# Patient Record
Sex: MALE | Race: WHITE | NOT HISPANIC OR LATINO | Employment: OTHER | URBAN - METROPOLITAN AREA
[De-identification: names, ages, dates, MRNs, and addresses within clinical notes are randomized per-mention and may not be internally consistent; named-entity substitution may affect disease eponyms.]

---

## 2022-07-06 ENCOUNTER — APPOINTMENT (OUTPATIENT)
Dept: RADIOLOGY | Facility: MEDICAL CENTER | Age: 67
End: 2022-07-06
Attending: EMERGENCY MEDICINE
Payer: MEDICARE

## 2022-07-06 ENCOUNTER — HOSPITAL ENCOUNTER (OUTPATIENT)
Facility: MEDICAL CENTER | Age: 67
End: 2022-07-08
Attending: EMERGENCY MEDICINE | Admitting: INTERNAL MEDICINE
Payer: MEDICARE

## 2022-07-06 DIAGNOSIS — R26.81 GAIT INSTABILITY: ICD-10-CM

## 2022-07-06 DIAGNOSIS — T07.XXXA MULTIPLE OPEN WOUNDS: ICD-10-CM

## 2022-07-06 DIAGNOSIS — M79.671 RIGHT FOOT PAIN: ICD-10-CM

## 2022-07-06 PROBLEM — Z51.89 ENCOUNTER FOR WOUND CARE: Status: ACTIVE | Noted: 2022-07-06

## 2022-07-06 PROBLEM — T81.30XA WOUND DEHISCENCE: Status: ACTIVE | Noted: 2022-07-06

## 2022-07-06 LAB
ALBUMIN SERPL BCP-MCNC: 3.9 G/DL (ref 3.2–4.9)
ALBUMIN/GLOB SERPL: 1.7 G/DL
ALP SERPL-CCNC: 92 U/L (ref 30–99)
ALT SERPL-CCNC: 41 U/L (ref 2–50)
ANION GAP SERPL CALC-SCNC: 12 MMOL/L (ref 7–16)
AST SERPL-CCNC: 29 U/L (ref 12–45)
BASOPHILS # BLD AUTO: 0.8 % (ref 0–1.8)
BASOPHILS # BLD: 0.05 K/UL (ref 0–0.12)
BILIRUB SERPL-MCNC: 0.4 MG/DL (ref 0.1–1.5)
BUN SERPL-MCNC: 22 MG/DL (ref 8–22)
CALCIUM SERPL-MCNC: 8.9 MG/DL (ref 8.5–10.5)
CHLORIDE SERPL-SCNC: 106 MMOL/L (ref 96–112)
CK SERPL-CCNC: 309 U/L (ref 0–154)
CO2 SERPL-SCNC: 22 MMOL/L (ref 20–33)
CREAT SERPL-MCNC: 0.89 MG/DL (ref 0.5–1.4)
EOSINOPHIL # BLD AUTO: 0.12 K/UL (ref 0–0.51)
EOSINOPHIL NFR BLD: 2 % (ref 0–6.9)
ERYTHROCYTE [DISTWIDTH] IN BLOOD BY AUTOMATED COUNT: 44.9 FL (ref 35.9–50)
GFR SERPLBLD CREATININE-BSD FMLA CKD-EPI: 93 ML/MIN/1.73 M 2
GLOBULIN SER CALC-MCNC: 2.3 G/DL (ref 1.9–3.5)
GLUCOSE SERPL-MCNC: 76 MG/DL (ref 65–99)
HCT VFR BLD AUTO: 41.8 % (ref 42–52)
HGB BLD-MCNC: 14 G/DL (ref 14–18)
IMM GRANULOCYTES # BLD AUTO: 0.06 K/UL (ref 0–0.11)
IMM GRANULOCYTES NFR BLD AUTO: 1 % (ref 0–0.9)
LYMPHOCYTES # BLD AUTO: 1.2 K/UL (ref 1–4.8)
LYMPHOCYTES NFR BLD: 20 % (ref 22–41)
MCH RBC QN AUTO: 30.9 PG (ref 27–33)
MCHC RBC AUTO-ENTMCNC: 33.5 G/DL (ref 33.7–35.3)
MCV RBC AUTO: 92.3 FL (ref 81.4–97.8)
MONOCYTES # BLD AUTO: 0.64 K/UL (ref 0–0.85)
MONOCYTES NFR BLD AUTO: 10.7 % (ref 0–13.4)
NEUTROPHILS # BLD AUTO: 3.92 K/UL (ref 1.82–7.42)
NEUTROPHILS NFR BLD: 65.5 % (ref 44–72)
NRBC # BLD AUTO: 0 K/UL
NRBC BLD-RTO: 0 /100 WBC
PLATELET # BLD AUTO: 367 K/UL (ref 164–446)
PMV BLD AUTO: 9.6 FL (ref 9–12.9)
POTASSIUM SERPL-SCNC: 3.9 MMOL/L (ref 3.6–5.5)
PROT SERPL-MCNC: 6.2 G/DL (ref 6–8.2)
RBC # BLD AUTO: 4.53 M/UL (ref 4.7–6.1)
SODIUM SERPL-SCNC: 140 MMOL/L (ref 135–145)
WBC # BLD AUTO: 6 K/UL (ref 4.8–10.8)

## 2022-07-06 PROCEDURE — 36415 COLL VENOUS BLD VENIPUNCTURE: CPT

## 2022-07-06 PROCEDURE — 73610 X-RAY EXAM OF ANKLE: CPT | Mod: RT

## 2022-07-06 PROCEDURE — A9270 NON-COVERED ITEM OR SERVICE: HCPCS | Performed by: EMERGENCY MEDICINE

## 2022-07-06 PROCEDURE — G0378 HOSPITAL OBSERVATION PER HR: HCPCS

## 2022-07-06 PROCEDURE — 99285 EMERGENCY DEPT VISIT HI MDM: CPT

## 2022-07-06 PROCEDURE — 85025 COMPLETE CBC W/AUTO DIFF WBC: CPT

## 2022-07-06 PROCEDURE — 82550 ASSAY OF CK (CPK): CPT

## 2022-07-06 PROCEDURE — 80053 COMPREHEN METABOLIC PANEL: CPT

## 2022-07-06 PROCEDURE — 99219 PR INITIAL OBSERVATION CARE,LEVL II: CPT | Performed by: INTERNAL MEDICINE

## 2022-07-06 PROCEDURE — 700102 HCHG RX REV CODE 250 W/ 637 OVERRIDE(OP): Performed by: EMERGENCY MEDICINE

## 2022-07-06 PROCEDURE — 700102 HCHG RX REV CODE 250 W/ 637 OVERRIDE(OP): Performed by: STUDENT IN AN ORGANIZED HEALTH CARE EDUCATION/TRAINING PROGRAM

## 2022-07-06 PROCEDURE — 73630 X-RAY EXAM OF FOOT: CPT | Mod: RT

## 2022-07-06 PROCEDURE — A9270 NON-COVERED ITEM OR SERVICE: HCPCS | Performed by: STUDENT IN AN ORGANIZED HEALTH CARE EDUCATION/TRAINING PROGRAM

## 2022-07-06 RX ORDER — ACETAMINOPHEN 325 MG/1
650 TABLET ORAL EVERY 6 HOURS PRN
Status: DISCONTINUED | OUTPATIENT
Start: 2022-07-06 | End: 2022-07-08 | Stop reason: HOSPADM

## 2022-07-06 RX ORDER — ACETAMINOPHEN 500 MG
1000 TABLET ORAL EVERY 6 HOURS PRN
COMMUNITY

## 2022-07-06 RX ORDER — OXYCODONE HYDROCHLORIDE 5 MG/1
5 TABLET ORAL EVERY 4 HOURS PRN
Status: DISCONTINUED | OUTPATIENT
Start: 2022-07-06 | End: 2022-07-08 | Stop reason: HOSPADM

## 2022-07-06 RX ORDER — IBUPROFEN 200 MG
400 TABLET ORAL EVERY 6 HOURS PRN
COMMUNITY

## 2022-07-06 RX ORDER — ACETAMINOPHEN 500 MG
1000 TABLET ORAL ONCE
Status: COMPLETED | OUTPATIENT
Start: 2022-07-06 | End: 2022-07-06

## 2022-07-06 RX ORDER — ASPIRIN 325 MG
325 TABLET ORAL EVERY 12 HOURS
COMMUNITY

## 2022-07-06 RX ADMIN — ACETAMINOPHEN 1000 MG: 500 TABLET ORAL at 13:43

## 2022-07-06 RX ADMIN — RIVAROXABAN 10 MG: 10 TABLET, FILM COATED ORAL at 18:17

## 2022-07-06 ASSESSMENT — ENCOUNTER SYMPTOMS
FEVER: 0
WEAKNESS: 0
SEIZURES: 0
BLOOD IN STOOL: 0
CONSTIPATION: 0
SENSORY CHANGE: 0
DIARRHEA: 0
PALPITATIONS: 0
MUSCULOSKELETAL NEGATIVE: 1
RESPIRATORY NEGATIVE: 1
EYES NEGATIVE: 1
DIAPHORESIS: 0
HEADACHES: 0
WEIGHT LOSS: 0
GASTROINTESTINAL NEGATIVE: 1
ABDOMINAL PAIN: 0
PSYCHIATRIC NEGATIVE: 1
LOSS OF CONSCIOUSNESS: 0
FOCAL WEAKNESS: 0
SPEECH CHANGE: 0
CHILLS: 0
NAUSEA: 0
SHORTNESS OF BREATH: 0
DIZZINESS: 1
CARDIOVASCULAR NEGATIVE: 1
VOMITING: 0

## 2022-07-06 ASSESSMENT — PATIENT HEALTH QUESTIONNAIRE - PHQ9
SUM OF ALL RESPONSES TO PHQ9 QUESTIONS 1 AND 2: 0
1. LITTLE INTEREST OR PLEASURE IN DOING THINGS: NOT AT ALL
2. FEELING DOWN, DEPRESSED, IRRITABLE, OR HOPELESS: NOT AT ALL

## 2022-07-06 ASSESSMENT — LIFESTYLE VARIABLES
ALCOHOL_USE: YES
EVER FELT BAD OR GUILTY ABOUT YOUR DRINKING: NO
ON A TYPICAL DAY WHEN YOU DRINK ALCOHOL HOW MANY DRINKS DO YOU HAVE: 2
CONSUMPTION TOTAL: NEGATIVE
HAVE YOU EVER FELT YOU SHOULD CUT DOWN ON YOUR DRINKING: NO
TOTAL SCORE: 0
DOES PATIENT WANT TO STOP DRINKING: NO
HAVE PEOPLE ANNOYED YOU BY CRITICIZING YOUR DRINKING: NO
AVERAGE NUMBER OF DAYS PER WEEK YOU HAVE A DRINK CONTAINING ALCOHOL: 6
EVER HAD A DRINK FIRST THING IN THE MORNING TO STEADY YOUR NERVES TO GET RID OF A HANGOVER: NO
HOW MANY TIMES IN THE PAST YEAR HAVE YOU HAD 5 OR MORE DRINKS IN A DAY: 0

## 2022-07-06 ASSESSMENT — FIBROSIS 4 INDEX: FIB4 SCORE: 0.84

## 2022-07-06 ASSESSMENT — PAIN DESCRIPTION - PAIN TYPE: TYPE: ACUTE PAIN

## 2022-07-06 NOTE — ASSESSMENT & PLAN NOTE
Ulcers on right lower extremity show no signs of surrounding erythema, white discharge. Patient has no history of diabetes or poor wound healing. Does not have infection or complication of his wounds at present. However, home health is uncomfortable taking care of his wound. Will need placement in short term to provide wound care.   -Wound care consult  -Placement to Obs floor  -Awaiting placement to SNF

## 2022-07-06 NOTE — H&P
History & Physical Note    Date of Admission: 7/6/2022  Admission Status: Observation-Outpatient  [facility] Team: [facility] Team  Attending: Leonard Soares M.D.   Senior Resident: Dr. Renner  Intern: Dr. Vega  Contact Number: [phone]    Chief Complaint: Encounter for Wound Care      History of Present Illness (HPI):   Beny is a 66 y.o. male w/ PMH of paroxysmal afib currently in sinus rhythm, mitral regurgitation s/p annuloplasty, CAD, HLD, HTN who presented 7/6/2022 to the ED from home with wound care concerns. The patient was wounded attempting to move a piano on 6/28/22, using machinery to attempt to lift it himself, when the piano toppled over on its side and pinned his distal right lower extremity to the ground. The patient was pinned for ~15 hours and was able to use the cherry  close by to free himself. He subsequently presented to [facility] on 6/29/22 where the patient was informed he had a fracture. He was treated for rhabdomyolysis 2/2 crush injury and discharged 7/5/22 to home with home health arranged for wound care. The patient's home health nurse felt uncomfortable managing at home due to weeping and requiring multiple wound dressing changes per day. The nurse advised the patient present to the hospital for SNF placement. At present, the patient denies pain in his right foot. The patient denies fever, chills, or white discharge from his wound.    Review of Systems:   Review of Systems   Constitutional: Negative for chills, diaphoresis, fever, malaise/fatigue and weight loss.   HENT: Negative.  Negative for congestion, ear discharge, ear pain, hearing loss, nosebleeds and tinnitus.    Eyes: Negative.    Respiratory: Negative.  Negative for shortness of breath.    Cardiovascular: Negative.  Negative for chest pain and palpitations.   Gastrointestinal: Negative.  Negative for abdominal pain, blood in stool, constipation, diarrhea, melena, nausea and vomiting.   Genitourinary: Negative.   Negative for dysuria and hematuria.   Musculoskeletal: Negative.    Skin: Negative for itching and rash.   Neurological: Positive for dizziness. Negative for sensory change, speech change, focal weakness, seizures, loss of consciousness, weakness and headaches.   Endo/Heme/Allergies: Negative.    Psychiatric/Behavioral: Negative.        When reviewing histories, add date and indication in the history section whenever possible.  Past Medical History:   Past Medical History was reviewed with patient.   has a past medical history of Atrial fibrillation (HCC).    He has no past medical history of Cancer (HCC), History of myocardial infarction, or Syncope.    Past Surgical History: Past Surgical History was reviewed with patient.   has a past surgical history that includes zzz cardiac cath and mitral valve replace.    Medications: Medications have been reviewed with patient.  Prior to Admission Medications   Prescriptions Last Dose Informant Patient Reported? Taking?   acetaminophen (TYLENOL) 500 MG Tab 2022 at PM Patient No Yes   Sig: Take 1,000 mg by mouth every 6 hours as needed for Mild Pain. 2 tablets = 1,000 mg.   aspirin (ASA) 325 MG Tab 2022 at AM Patient No No   Sig: Take 325 mg by mouth every 12 hours. Indications: DVT Prevention   ibuprofen (MOTRIN) 200 MG Tab 2022 at PRN Patient Yes Yes   Sig: Take 400 mg by mouth every 6 hours as needed for Mild Pain. 2 tablets = 400 mg.      Facility-Administered Medications: None        Allergies: Allergies have been reviewed with patient.  No Known Allergies    Family History:   Family history as reported by patient includes unknown cancer in his father,  in his 80's    Social History:   Tobacco: smokes 12 oz loose leaf tobacco per month for 50 years, equivalent to 40 pack-years  Alcohol: 2-3 standard drinks per day  Recreational drugs (illegal and prescription):  denies  Employment: retired, formerly   Activity Level: ambulatory  Living  situation: lives alone on 1 acre property, family members close by  Recent travel: denies  Primary Care Provider: dwight Weathers M.D.  Other (stressors, spirituality, exposures):  denies  Physical Exam:   Vitals:  Temp:  [36.4 °C (97.5 °F)] 36.4 °C (97.5 °F)  Pulse:  [79-85] 84  Resp:  [16-18] 18  BP: (126-146)/(73-84) 146/83  SpO2:  [90 %-98 %] 93 %    Physical Exam  Constitutional:       General: He is not in acute distress.     Appearance: Normal appearance.   HENT:      Head: Normocephalic and atraumatic.      Nose: Nose normal.      Mouth/Throat:      Mouth: Mucous membranes are moist.      Pharynx: Oropharynx is clear.   Eyes:      Extraocular Movements: Extraocular movements intact.      Pupils: Pupils are equal, round, and reactive to light.   Cardiovascular:      Rate and Rhythm: Normal rate and regular rhythm.      Pulses: Normal pulses.      Heart sounds: Normal heart sounds.   Pulmonary:      Effort: Pulmonary effort is normal.      Breath sounds: Normal breath sounds.   Abdominal:      General: Abdomen is flat. Bowel sounds are normal.      Palpations: Abdomen is soft.   Musculoskeletal:         General: Normal range of motion.      Cervical back: Normal range of motion.   Skin:     General: Skin is warm and dry.      Capillary Refill: Capillary refill takes less than 2 seconds.      Findings: Signs of injury and wound present.          Neurological:      General: No focal deficit present.      Mental Status: He is alert and oriented to person, place, and time.         Labs:   Admission on 07/06/2022   Component Date Value   • WBC 07/06/2022 6.0    • RBC 07/06/2022 4.53 (A)   • Hemoglobin 07/06/2022 14.0    • Hematocrit 07/06/2022 41.8 (A)   • MCV 07/06/2022 92.3    • MCH 07/06/2022 30.9    • MCHC 07/06/2022 33.5 (A)   • RDW 07/06/2022 44.9    • Platelet Count 07/06/2022 367    • MPV 07/06/2022 9.6    • Neutrophils-Polys 07/06/2022 65.50    • Lymphocytes 07/06/2022 20.00 (A)   • Monocytes  07/06/2022 10.70    • Eosinophils 07/06/2022 2.00    • Basophils 07/06/2022 0.80    • Immature Granulocytes 07/06/2022 1.00 (A)   • Nucleated RBC 07/06/2022 0.00    • Neutrophils (Absolute) 07/06/2022 3.92    • Lymphs (Absolute) 07/06/2022 1.20    • Monos (Absolute) 07/06/2022 0.64    • Eos (Absolute) 07/06/2022 0.12    • Baso (Absolute) 07/06/2022 0.05    • Immature Granulocytes (a* 07/06/2022 0.06    • NRBC (Absolute) 07/06/2022 0.00    • Sodium 07/06/2022 140    • Potassium 07/06/2022 3.9    • Chloride 07/06/2022 106    • Co2 07/06/2022 22    • Anion Gap 07/06/2022 12.0    • Glucose 07/06/2022 76    • Bun 07/06/2022 22    • Creatinine 07/06/2022 0.89    • Calcium 07/06/2022 8.9    • AST(SGOT) 07/06/2022 29    • ALT(SGPT) 07/06/2022 41    • Alkaline Phosphatase 07/06/2022 92    • Total Bilirubin 07/06/2022 0.4    • Albumin 07/06/2022 3.9    • Total Protein 07/06/2022 6.2    • Globulin 07/06/2022 2.3    • A-G Ratio 07/06/2022 1.7    • CPK Total 07/06/2022 309 (A)   • GFR (CKD-EPI) 07/06/2022 93      Imaging:   DX-ANKLE 3+ VIEWS RIGHT   Final Result      1.  Acute mildly displaced small fracture of the dorsal talar neck.   2.  Periarticular soft tissue swelling.         DX-FOOT-COMPLETE 3+ RIGHT   Final Result      1.  Nondisplaced fracture of the fifth proximal phalangeal shaft.   2.  Mildly displaced small fracture of the dorsal talar neck.        Previous Data Review: reviewed    Problem Representation:     * Encounter for wound care  Assessment & Plan  Ulcers on right lower extremity show no signs of surrounding erythema, white discharge. Patient has no history of diabetes or poor wound healing. Does not have infection or complication of his wounds at present. However, home health is uncomfortable taking care of his wound. Will need placement in short term to provide wound care.   -Wound care consult  -Placement to Obs floor  -Awaiting placement to SNF    HLD (hyperlipidemia)- (present on admission)  Assessment  & Plan  Lost to follow up after previous physician moved. Not currently on any medication. Has not seen PCP in years. Will need outpatient follow up    HTN (hypertension)- (present on admission)  Assessment & Plan  Lost to follow up after previous physician moved. Not currently on any medication. Has not seen PCP in years. Will need outpatient follow up    Atrial fibrillation (HCC)- (present on admission)  Assessment & Plan  Holter Monitoring from 2014 showed 0% Afib, not currently symptomatic, in sinus rhythm. No telemetry or AC required at this time.     DVT prophylaxis: xorelto 10 mg  Diet: cardiac    Full code

## 2022-07-06 NOTE — DISCHARGE PLANNING
Pt was admitted at [facility] from 6/29/22 - 07/04/22

## 2022-07-06 NOTE — ED NOTES
Wound Care order placed and Wound RN called for consult. Pictures of RIGHT foot wound taken and uploaded to Epic.

## 2022-07-06 NOTE — ASSESSMENT & PLAN NOTE
Lost to follow up after previous physician moved. Not currently on any medication. Has not seen PCP in years. Will need outpatient follow up

## 2022-07-06 NOTE — ED TRIAGE NOTES
Chief Complaint   Patient presents with   • Foot Injury     right     Pt wheeled to triage he was d/c from [facility] last Monday. Last Tuesday pt was pinned under piano for 15 hours. He was diagnosed with rhabdomyolysis and crushed injury to his right foot and d/c with home health nurse. Home health nurse came last night and was advised to come here to be trf to Skilled facility for daily right foot dressings. Pt has multiple wounds from blisters that formed and opened.

## 2022-07-06 NOTE — ASSESSMENT & PLAN NOTE
Lost to follow up after previous physician moved. Not currently on any medication. Has not seen PCP in years. Will need outpatient follow up   Spontaneous, unlabored and symmetrical

## 2022-07-06 NOTE — ED PROVIDER NOTES
ED Provider Note    Scribed for Kevin Torrez M.D. by Juwan Chacon. 7/6/2022, 9:51 AM.    Primary care provider: Aurelio Weathers M.D.  Means of arrival: Walk-in  History obtained from: Patient  History limited by: None    CHIEF COMPLAINT  Chief Complaint   Patient presents with   • Foot Injury     right       HPI  Beny Tolentino is a 66 y.o. male who presents to the Emergency Department for evaluation of a right foot injury. Onset on eight days ago. He states that his foot was pinned under a piano for fifteen hours. Following this he [facility] to [facility]. He was admitted to the hospital and informed that he had a fracture. He was also diagnosed with rhabdomyolysis at this time. He was discharged two days ago with a home health nurse. However, the home health care he was assigned is not able to come to daily to change bandages. The patient and his family state that he is weeping  through his bandages and needs daily re-dressing. They are unable to change the dressing themselves. He was referred to present to the ED to be transferred to a skilled facility to receive daily dressings.  They expressed significant concern that he has a lot of weeping from the open blisters and is unable to care for himself, ambulate or walk, and unable to wrap or change dressing.  The pain was unable to care for him.  He has a history of multiple kidney stones and open heart surgery for a mitral valvae prolapse. There are no known alleviating or exacerbating factors.       REVIEW OF SYSTEMS  Review of Systems   Musculoskeletal:        Positive for injury to right foot with weeping.    All other systems reviewed and are negative.      PAST MEDICAL HISTORY   has a past medical history of Atrial fibrillation (HCC).    SURGICAL HISTORY   has a past surgical history that includes zzz cardiac cath and mitral valve replace.    SOCIAL HISTORY  Social History     Tobacco Use   • Smoking status: Current Every Day Smoker      Years: 40.00     Types: Cigars   • Smokeless tobacco: Never Used   Substance Use Topics   • Alcohol use: Yes   • Drug use: No      Social History     Substance and Sexual Activity   Drug Use No       FAMILY HISTORY  Family History   Problem Relation Age of Onset   • Other Mother    • Other Father        CURRENT MEDICATIONS  Home Medications     Reviewed by Ro Kay R.N. (Registered Nurse) on 07/06/22 at 0733  Med List Status: Complete   Medication Last Dose Status        Patient Fabian Taking any Medications                       ALLERGIES  No Known Allergies    PHYSICAL EXAM  VITAL SIGNS: /80   Pulse 79   Temp 36.4 °C (97.5 °F) (Temporal)   Resp 16   Ht 1.829 m (6')   Wt 72.6 kg (160 lb)   SpO2 97%   BMI 21.70 kg/m²   Vitals reviewed.  Constitutional: Well developed, Well nourished, No acute distress, Non-toxic appearance.   HENT: Normocephalic, Atraumatic, Bilateral external ears normal,   Eyes: PERRL, EOMI, Conjunctiva normal, No discharge.   Neck: Normal range of motion, No tenderness, Supple, No stridor.   Cardiovascular: Normal heart rate, Normal rhythm, No murmurs, No rubs, No gallops.   Thorax & Lungs: Normal breath sounds, No respiratory distress, No wheezing  Abdomen: Bowel sounds normal, Soft, No tenderness  Skin: Warm, Dry, No erythema, No rash.   Back: No tenderness, No CVA tenderness.    Musculoskeletal: Good range of motion in all major joints.  Right foot has multiple blisters many of which are unroofed.  The ones that are still of some fluids are leaking and weeping.  There is mild redness but does not appear infected there is no purulence.  His foot is perfused.  There is intact movement and sensation.  Neurologic: Alert, No focal deficits noted.   Psychiatric: Affect normal    LABS   Results for orders placed or performed during the hospital encounter of 07/06/22   CBC WITH DIFFERENTIAL   Result Value Ref Range    WBC 6.0 4.8 - 10.8 K/uL    RBC 4.53 (L) 4.70 - 6.10 M/uL     Hemoglobin 14.0 14.0 - 18.0 g/dL    Hematocrit 41.8 (L) 42.0 - 52.0 %    MCV 92.3 81.4 - 97.8 fL    MCH 30.9 27.0 - 33.0 pg    MCHC 33.5 (L) 33.7 - 35.3 g/dL    RDW 44.9 35.9 - 50.0 fL    Platelet Count 367 164 - 446 K/uL    MPV 9.6 9.0 - 12.9 fL    Neutrophils-Polys 65.50 44.00 - 72.00 %    Lymphocytes 20.00 (L) 22.00 - 41.00 %    Monocytes 10.70 0.00 - 13.40 %    Eosinophils 2.00 0.00 - 6.90 %    Basophils 0.80 0.00 - 1.80 %    Immature Granulocytes 1.00 (H) 0.00 - 0.90 %    Nucleated RBC 0.00 /100 WBC    Neutrophils (Absolute) 3.92 1.82 - 7.42 K/uL    Lymphs (Absolute) 1.20 1.00 - 4.80 K/uL    Monos (Absolute) 0.64 0.00 - 0.85 K/uL    Eos (Absolute) 0.12 0.00 - 0.51 K/uL    Baso (Absolute) 0.05 0.00 - 0.12 K/uL    Immature Granulocytes (abs) 0.06 0.00 - 0.11 K/uL    NRBC (Absolute) 0.00 K/uL   COMP METABOLIC PANEL   Result Value Ref Range    Sodium 140 135 - 145 mmol/L    Potassium 3.9 3.6 - 5.5 mmol/L    Chloride 106 96 - 112 mmol/L    Co2 22 20 - 33 mmol/L    Anion Gap 12.0 7.0 - 16.0    Glucose 76 65 - 99 mg/dL    Bun 22 8 - 22 mg/dL    Creatinine 0.89 0.50 - 1.40 mg/dL    Calcium 8.9 8.5 - 10.5 mg/dL    AST(SGOT) 29 12 - 45 U/L    ALT(SGPT) 41 2 - 50 U/L    Alkaline Phosphatase 92 30 - 99 U/L    Total Bilirubin 0.4 0.1 - 1.5 mg/dL    Albumin 3.9 3.2 - 4.9 g/dL    Total Protein 6.2 6.0 - 8.2 g/dL    Globulin 2.3 1.9 - 3.5 g/dL    A-G Ratio 1.7 g/dL   CREATINE KINASE   Result Value Ref Range    CPK Total 309 (H) 0 - 154 U/L   ESTIMATED GFR   Result Value Ref Range    GFR (CKD-EPI) 93 >60 mL/min/1.73 m 2       All labs reviewed by me.    RADIOLOGY  DX-ANKLE 3+ VIEWS RIGHT   Final Result      1.  Acute mildly displaced small fracture of the dorsal talar neck.   2.  Periarticular soft tissue swelling.         DX-FOOT-COMPLETE 3+ RIGHT   Final Result      1.  Nondisplaced fracture of the fifth proximal phalangeal shaft.   2.  Mildly displaced small fracture of the dorsal talar neck.        The radiologist's  interpretation of all radiological studies have been reviewed by me.    COURSE & MEDICAL DECISION MAKING  Pertinent Labs & Imaging studies reviewed. (See chart for details)    Obtained and reviewed past medical records.    9:51 AM Patient seen and examined at bedside. The patient presents with an injury to the right foot, and the differential diagnosis includes but is not limited to open wounds, fractures, early infection.  Ordered for DX-Ankle 3+ views right, Dx-Foot complete 3+ right, Creatine kinase, CBC with differential, and CMP to evaluate.     12:59 PM Patient was reevaluated at bedside. Discussed lab and radiology results with the patient.      I debrided the blisters on the right foot.  Most of these are already open and some skin remains.  The blisters that are still containing fluid are all weeping and oozing and are obviously open and weeping.  These are debrided by me.    Debridement procedure note    Area is debrided with scissors without difficulty.  Dry dressing is placed.    Ultimately was converted to a Xeroform dressing with a walking boot.      I have consulted wound care with him and unable to come down.  It is unclear to me the wounds require hospitalization they do not appear to require surgical debridement and they do not appear infected.  The family is significantly concerned about the patient's inability to stand, walk or care for himself.  They state they cannot care for him.  Family is requesting hospitalization possible SNF placement.    12:58 PM Paged Hospitalist.     1:16 PM I discussed the patient's case and the above findings with Dr. Soares (Hospitalist) who agrees to evaluate the patient for hospitalization.    1:26 PM - I reevaluated the patient at bedside. Patient will be treated with tylenol tablet 1,000 mg. I informed the patient of my plan to admit today given the patient's current presentation and diagnostic study results. Patient verbalizes understanding and support with my  plan for admission.           DISPOSITION:  Patient will be hospitalized by Dr. Soares in guarded condition.      FINAL IMPRESSION  1. Multiple open wounds    2. Right foot pain    3. Gait instability    4.  Inability to care for self     Juwan MCCLURE (Scribe), am scribing for, and in the presence of, Kevin Torrez M.D..    Electronically signed by: Juwan Chacon (Scribe), 7/6/2022    Kevin MCCLURE M.D. personally performed the services described in this documentation, as scribed by Juwan Chacon in my presence, and it is both accurate and complete.    The note accurately reflects work and decisions made by me.  Kevin Torrez M.D.  7/6/2022  3:29 PM

## 2022-07-06 NOTE — ED NOTES
Attempted to call report to receiving RN. RN busy at this time and stated he would call right back.

## 2022-07-06 NOTE — DISCHARGE PLANNING
Anticipated Discharge Disposition: SNF vs Home with more dressing change support    Action: CM spoke with CM at [facility] and pt was discharged with [facility]. Visiting nurse sent pt to [facility] today (as we 'have the best wound care') to be placed in a SNF as she feels pt needs daily dressing changes. RN comes 3x/week, pt states he can manage some changes but family lives an hour away. SNF choice was obtained and scanned to media tab for future dc planning use.    CM at [facility] confirmed pt had an INPT stay from 6/29-7/4.      Barriers to Discharge: Wound consult for recommendations on dressing changes; PT consult    Plan: admit OBS

## 2022-07-06 NOTE — ED NOTES
Pt transported off unit with all belongings with transport tech. Pt awake and breathing with even, unlabored respirations at time of transfer.

## 2022-07-06 NOTE — ED NOTES
Walking boot delivered to bedside. Per , pt requested walking boot from [facility] but wound team felt that pt's blisters were too severe/open for walking boot. Will wait for wound team's assessment today to place boot on patient.

## 2022-07-06 NOTE — ASSESSMENT & PLAN NOTE
Holter Monitoring from 2014 showed 0% Afib, not currently symptomatic, in sinus rhythm. No telemetry or AC required at this time.

## 2022-07-06 NOTE — ED NOTES
Report received from [facility]. Pt is A&Ox4 and awake in bed. Pt on pulse ox and automatic BP. VSS, saturating above 95% on RA, HR in the 80s.

## 2022-07-06 NOTE — ED NOTES
Triage rounding: I apologized for the wait, explained that we have to area of ER for fast track and main. Waiting for room in main area for possible admission.   Nad.

## 2022-07-07 ENCOUNTER — APPOINTMENT (OUTPATIENT)
Dept: RADIOLOGY | Facility: MEDICAL CENTER | Age: 67
End: 2022-07-07
Attending: INTERNAL MEDICINE
Payer: MEDICARE

## 2022-07-07 PROBLEM — S92.111A: Status: ACTIVE | Noted: 2022-07-07

## 2022-07-07 PROBLEM — S91.301A OPEN WOUND OF RIGHT FOOT: Status: ACTIVE | Noted: 2022-07-07

## 2022-07-07 PROCEDURE — 700102 HCHG RX REV CODE 250 W/ 637 OVERRIDE(OP): Performed by: STUDENT IN AN ORGANIZED HEALTH CARE EDUCATION/TRAINING PROGRAM

## 2022-07-07 PROCEDURE — 99225 PR SUBSEQUENT OBSERVATION CARE,LEVEL II: CPT | Performed by: INTERNAL MEDICINE

## 2022-07-07 PROCEDURE — 73700 CT LOWER EXTREMITY W/O DYE: CPT | Mod: RT,MG

## 2022-07-07 PROCEDURE — A9270 NON-COVERED ITEM OR SERVICE: HCPCS | Performed by: STUDENT IN AN ORGANIZED HEALTH CARE EDUCATION/TRAINING PROGRAM

## 2022-07-07 PROCEDURE — 97162 PT EVAL MOD COMPLEX 30 MIN: CPT

## 2022-07-07 PROCEDURE — G0378 HOSPITAL OBSERVATION PER HR: HCPCS

## 2022-07-07 RX ORDER — ENOXAPARIN SODIUM 100 MG/ML
40 INJECTION SUBCUTANEOUS DAILY
Status: DISCONTINUED | OUTPATIENT
Start: 2022-07-08 | End: 2022-07-08 | Stop reason: HOSPADM

## 2022-07-07 RX ADMIN — ACETAMINOPHEN 650 MG: 325 TABLET, FILM COATED ORAL at 16:12

## 2022-07-07 RX ADMIN — ACETAMINOPHEN 650 MG: 325 TABLET, FILM COATED ORAL at 09:03

## 2022-07-07 RX ADMIN — ACETAMINOPHEN 650 MG: 325 TABLET, FILM COATED ORAL at 22:32

## 2022-07-07 ASSESSMENT — COGNITIVE AND FUNCTIONAL STATUS - GENERAL
STANDING UP FROM CHAIR USING ARMS: A LITTLE
MOVING FROM LYING ON BACK TO SITTING ON SIDE OF FLAT BED: A LITTLE
MOVING TO AND FROM BED TO CHAIR: A LITTLE
MOBILITY SCORE: 19
SUGGESTED CMS G CODE MODIFIER MOBILITY: CK
CLIMB 3 TO 5 STEPS WITH RAILING: A LITTLE
WALKING IN HOSPITAL ROOM: A LITTLE

## 2022-07-07 ASSESSMENT — ENCOUNTER SYMPTOMS
SEIZURES: 0
MYALGIAS: 0
ABDOMINAL PAIN: 0
DIAPHORESIS: 0
PALPITATIONS: 0
NECK PAIN: 0
FEVER: 0
BLOOD IN STOOL: 0
SPUTUM PRODUCTION: 0
FOCAL WEAKNESS: 0
NAUSEA: 0
VOMITING: 0
DIZZINESS: 0
SORE THROAT: 0
WHEEZING: 0
FLANK PAIN: 0
BLURRED VISION: 0
COUGH: 0
BRUISES/BLEEDS EASILY: 0
DIARRHEA: 0
BACK PAIN: 0
HEADACHES: 0
CHILLS: 0
SHORTNESS OF BREATH: 0

## 2022-07-07 ASSESSMENT — PAIN DESCRIPTION - PAIN TYPE
TYPE: ACUTE PAIN

## 2022-07-07 ASSESSMENT — GAIT ASSESSMENTS
DISTANCE (FEET): 50
ASSISTIVE DEVICE: FRONT WHEEL WALKER
GAIT LEVEL OF ASSIST: SUPERVISED

## 2022-07-07 NOTE — PROGRESS NOTES
Hospital Medicine Daily Progress Note    Date of Service  7/7/2022    Chief Complaint  Beny Tolentino is a 66 y.o. male admitted 7/6/2022 with r foot wound    Hospital Course  66-year-old male who presented with a right foot pain and wound after his foot was pinned under a piano for 15 hours.  He was initially evaluated at [facility] and was diagnosed with rhabdomyolysis for which she was treated with IV fluids.  X-ray of his right ankle reveals acute mildly displaced fracture of talar neck    Interval Problem Update  Patient states he is having difficulty managing his wound at home and was instructed by his home health nurse to present to the ER.  He states he is unable to bear weight on his right foot.  I have consulted orthopedic surgery Dr. Espino who will evaluate the patient and has requested a CT of his right ankle.  Wound care has been consulted    I have discussed this patient's plan of care and discharge plan at IDT rounds today with Case Management, Nursing, Nursing leadership, and other members of the IDT team.    Consultants/Specialty  orthopedics    Code Status  Full Code    Disposition  Patient is not medically cleared for discharge.   Anticipate discharge to to skilled nursing facility.  I have placed the appropriate orders for post-discharge needs.    Review of Systems  Review of Systems   Constitutional: Negative for chills, diaphoresis and fever.   HENT: Negative for hearing loss and sore throat.    Eyes: Negative for blurred vision.   Respiratory: Negative for cough, sputum production, shortness of breath and wheezing.    Cardiovascular: Negative for chest pain, palpitations and leg swelling.   Gastrointestinal: Negative for abdominal pain, blood in stool, diarrhea, nausea and vomiting.   Genitourinary: Negative for dysuria, flank pain and urgency.   Musculoskeletal: Positive for joint pain (R foot and ankle). Negative for back pain, myalgias and neck pain.   Skin: Negative for rash.    Neurological: Negative for dizziness, focal weakness, seizures and headaches.   Endo/Heme/Allergies: Does not bruise/bleed easily.   Psychiatric/Behavioral: Negative for suicidal ideas.        Physical Exam  Temp:  [36.5 °C (97.7 °F)-36.8 °C (98.2 °F)] 36.8 °C (98.2 °F)  Pulse:  [63-93] 71  Resp:  [18-20] 18  BP: (114-154)/(70-88) 119/74  SpO2:  [90 %-98 %] 94 %    Physical Exam  Vitals and nursing note reviewed.   Constitutional:       General: He is not in acute distress.     Appearance: Normal appearance.   HENT:      Head: Normocephalic and atraumatic.      Nose: Nose normal.      Mouth/Throat:      Mouth: Mucous membranes are moist.   Eyes:      Extraocular Movements: Extraocular movements intact.      Conjunctiva/sclera: Conjunctivae normal.      Pupils: Pupils are equal, round, and reactive to light.   Cardiovascular:      Rate and Rhythm: Normal rate and regular rhythm.      Pulses: Normal pulses.      Heart sounds: Normal heart sounds.   Pulmonary:      Effort: Pulmonary effort is normal. No respiratory distress.      Breath sounds: Normal breath sounds. No wheezing, rhonchi or rales.   Abdominal:      General: Bowel sounds are normal. There is no distension.      Palpations: Abdomen is soft.      Tenderness: There is no abdominal tenderness.   Musculoskeletal:         General: No swelling or tenderness. Normal range of motion.      Cervical back: Normal range of motion and neck supple.   Lymphadenopathy:      Cervical: No cervical adenopathy.   Skin:     General: Skin is warm.      Coloration: Skin is not jaundiced.      Findings: No rash.      Comments: R foot wrapped in dressing   Neurological:      General: No focal deficit present.      Mental Status: He is alert and oriented to person, place, and time.      Cranial Nerves: No cranial nerve deficit.      Motor: No weakness.   Psychiatric:         Mood and Affect: Mood normal.         Behavior: Behavior normal.         Fluids    Intake/Output Summary  (Last 24 hours) at 7/7/2022 0907  Last data filed at 7/7/2022 0904  Gross per 24 hour   Intake 360 ml   Output 1575 ml   Net -1215 ml       Laboratory  Recent Labs     07/06/22  1142   WBC 6.0   RBC 4.53*   HEMOGLOBIN 14.0   HEMATOCRIT 41.8*   MCV 92.3   MCH 30.9   MCHC 33.5*   RDW 44.9   PLATELETCT 367   MPV 9.6     Recent Labs     07/06/22  1142   SODIUM 140   POTASSIUM 3.9   CHLORIDE 106   CO2 22   GLUCOSE 76   BUN 22   CREATININE 0.89   CALCIUM 8.9                   Imaging  DX-ANKLE 3+ VIEWS RIGHT   Final Result      1.  Acute mildly displaced small fracture of the dorsal talar neck.   2.  Periarticular soft tissue swelling.         DX-FOOT-COMPLETE 3+ RIGHT   Final Result      1.  Nondisplaced fracture of the fifth proximal phalangeal shaft.   2.  Mildly displaced small fracture of the dorsal talar neck.      CT-ANKLE W/O PLUS RECONS RIGHT    (Results Pending)   CT-FOOT W/O PLUS RECONS RIGHT    (Results Pending)        Assessment/Plan  Displaced fracture of neck of right talus  Assessment & Plan  Ortho has been consulted  Pain control with Tylenol and oxycodone  PT OT    Open wound of right foot  Assessment & Plan  crush injury from being pinned under a piano for 15 hours  Wound care has been consulted         VTE prophylaxis: enoxaparin ppx    I have performed a physical exam and reviewed and updated ROS and Plan today (7/7/2022). In review of yesterday's note (7/6/2022), there are no changes except as documented above.

## 2022-07-07 NOTE — PROGRESS NOTES
SENIOR ADMIT NOTE:    Carmella Renner M.D.  Date & Time note created:    7/6/2022   7:16 PM       Chief Complaint:  Weeping Wound    History of Present Illness:      66 y.o M w/pmhx of MR s/p annuloplasty, CAD, HLD, HTN, ?afib, who presented to the ED w/ c/o of weeping wound. Patient was admitted to [facility] for rhabdomylosis 2/2 crush injury (pinned underneath a piano for 16 hours) and fx of the right talus. He received aggressive fluid therapy for rhabdomyolysis which improved. He was evaluated by podiatry, no orthopedic consult done,  recommended walking boot and outpatient follow up. He was discharge on 7/4 with  for home care. Per family, HH RN evaluated wound and thought that he would need daily dressing, family is unable to assist so HH RN recommended that he be evaluated in the ED for facilitation to skilled nursing facility.   Patient reports that his pain is significantly improved, now 3/10. He is able to bear weight on his right foot, however, cannot ambulate. He denies any fever, chills, SOB since discharge.   Wound debridement done by EDP and dry dressing applied    Physical Exam:   Weight/BMI: Body mass index is 22.39 kg/m².  BP (!) 146/75   Pulse 66   Temp 36.7 °C (98 °F) (Temporal)   Resp 20   Ht 1.829 m (6')   Wt 74.9 kg (165 lb 2 oz)   SpO2 96%   Vitals:    07/06/22 1500 07/06/22 1600 07/06/22 1654 07/06/22 1917   BP: 132/88 (!) 154/79 (!) 146/75 126/70   Pulse: 93 79 66 75   Resp:   20 20   Temp:   36.7 °C (98 °F) 36.8 °C (98.2 °F)   TempSrc:   Temporal Temporal   SpO2: 98% 95% 96% 97%   Weight:   74.9 kg (165 lb 2 oz)    Height:   1.829 m (6')      Oxygen Therapy:  Pulse Oximetry: 96 %, O2 (LPM): 0, O2 Delivery Device: None - Room Air    Physical exam:  Constitutional:  No acute distress. A&O x3  HENMT:  Normocephalic, Atraumatic  Eyes:  PERRLA, EOMI, Conjunctiva normal  Neck:  Supple, Full range of motion, No stridor  Cardiovascular:  Regular rate and rhythm, No murmurs, No rubs, No  gallops.   Lungs: Respiratory effort is normal, no crackles, no wheezing.  Extremities: Good pedal pulses b/l, no edema, 5/5 strength.  Abdomen: Bowel sounds x4, Soft, Non-tender, Non-distended, No guarding, No rebound, No masses.  Neurologic: Good sensations, Cranial nerves II through XII grossly intact. No focal deficits noted.    Imaging  DX-ANKLE 3+ VIEWS RIGHT   Final Result      1.  Acute mildly displaced small fracture of the dorsal talar neck.   2.  Periarticular soft tissue swelling.         DX-FOOT-COMPLETE 3+ RIGHT   Final Result      1.  Nondisplaced fracture of the fifth proximal phalangeal shaft.   2.  Mildly displaced small fracture of the dorsal talar neck.          ASSESSMENT/PLAN:  #Open foot wound  #Displaced fx of dorsal talar neck   #Rhabdomyolysis from Crush injury   #?Afib   #MR s/p annuloplasty   - wound debridement done by EDP, no signs or symptoms of superimposed infection. Neurovascularly intact   - Wound consult placed.   - Ortho consult in AM for displaced fx (appears stable from previous), will keep non weight bearing on injured foot until evaluation   - PT/OT to assist with placement. CM to assist with placement   - Rhado resolved, encourage oral intake   - reported hx of sharmin-operative afib in 2014, however had holter monitoring done in 2015 w/ 0% Afib burden. Will need to follow up with cardiology to evaluate if longer term cardiac monitoring needed   -Stable CAD, follow up with PCP and cardiology for secondary prevention.     For full details please refer to H&P done by Dr. Silvia Renner M.D.

## 2022-07-07 NOTE — CARE PLAN
The patient is Stable - Low risk of patient condition declining or worsening    Shift Goals  Clinical Goals: Wound care, PT/OT, SNF placement  Patient Goals: rest    Progress made toward(s) clinical / shift goals:        Problem: Pain - Standard  Goal: Alleviation of pain or a reduction in pain to the patient’s comfort goal  Outcome: Progressing     Problem: Knowledge Deficit - Standard  Goal: Patient and family/care givers will demonstrate understanding of plan of care, disease process/condition, diagnostic tests and medications  Outcome: Progressing     Problem: Fall Risk  Goal: Patient will remain free from falls  Outcome: Progressing       Patient is not progressing towards the following goals:

## 2022-07-07 NOTE — PROGRESS NOTES
4 Eyes Skin Assessment Completed by Tee RN and NIKI Pimentel.    Head WDL  Ears WDL  Nose WDL  Mouth WDL  Neck WDL  Breast/Chest WDL  Shoulder Blades WDL  Spine WDL  (R) Arm/Elbow/Hand WDL  (L) Arm/Elbow/Hand WDL  Abdomen WDL  Groin WDL  Scrotum/Coccyx/Buttocks WDL  (R) Leg Bruising  (L) Leg WDL  (R) Heel/Foot/Toe Bruising  (L) Heel/Foot/Toe WDL          Devices In Places Pulse Ox      Interventions In Place N/A    Possible Skin Injury Yes    Pictures Uploaded Into Epic Yes  Wound Consult Placed Yes  RN Wound Prevention Protocol Ordered No

## 2022-07-07 NOTE — THERAPY
"Physical Therapy   Initial Evaluation     Patient Name: Beny Tolentino  Age:  66 y.o., Sex:  male  Medical Record #: [id number]  Today's Date: 7/7/2022     Precautions  Precautions: Fall Risk;Non Weight Bearing Right Lower Extremity  Comments: NWB R LE per patient- he thinks he recalls being told at [place] he could be WBAT in boot  but unsure    Assessment  Mr. Tolentino is a 65 y/o male who presents to [facility] for wound care to R foot. He had recently been discharged home from [facility] secondary to a crush injury to R foot (foot was stuck under piano for 15 hours). No further information was provided. Pt reported \"they told me I broke two bones but they were aligned so they weren't going to do anything\". He also reported being NWB R LE, however recalls his MD stating that once his wounds allow he can wear a boot and then be WBAT. Relayed this to RN who plans to call [facility] to get definitive information. Since initial chart review and meeting with patient imaging has revealed talus fracture. Pt was mobilizing at home NWB with FWW, only had issue with wounds.    Pt able to demonstrate gait, transfers, and bed mobility without physical assist. Steady with FWW and maintained NWB throughout. Discussed plan to find out WB with boot, as if this is correct pt's mobility will only improve. Pt in agreement with this. Otherwise no additional acute PT needs as he is mobilizing without assist. Will defer placement needs to wound care as this appears to be his primary deficit.  Patient will not be actively followed for physical therapy services at this time, however may be seen if requested by physician for 1 more visit within 30 days to address any discharge or equipment needs.       Plan    Patient will not be actively followed for physical therapy services at this time, however may be seen if requested by physician for 1 more visit within 30 days to address any discharge or equipment needs.     DC Equipment Recommendations: " None  Discharge Recommendations: Anticipate that the patient will have no further physical therapy needs after discharge from the hospital            Objective       07/07/22 1045   Prior Living Situation   Prior Services None   Housing / Facility 1 Story House   Steps Into Home 1   Equipment Owned Front-Wheel Walker   Lives with - Patient's Self Care Capacity Alone and Able to Care For Self   Comments has daughters in [place] as well   Prior Level of Functional Mobility   Bed Mobility Independent   Transfer Status Independent   Ambulation Independent   Distance Ambulation (Feet)   (community ambulator)   Assistive Devices Used None   Stairs Independent   Comments typically no AD, however recently discharged from [place] NWB with FWW to home   Cognition    Level of Consciousness Alert   Comments very pleasant   Passive ROM Lower Body   Passive ROM Lower Body WDL   Active ROM Lower Body    Active ROM Lower Body  WDL   Strength Lower Body   Lower Body Strength  WDL   Sensation Lower Body   Lower Extremity Sensation   WDL   Balance Assessment   Sitting Balance (Static) Good   Sitting Balance (Dynamic) Good   Standing Balance (Static) Good   Standing Balance (Dynamic) Fair   Weight Shift Sitting Good   Weight Shift Standing Absent   Comments NWB with FWW   Gait Analysis   Gait Level Of Assist Supervised   Assistive Device Front Wheel Walker   Distance (Feet) 50   # of Times Distance was Traveled 1   Deviation   (NWB gait)   Weight Bearing Status NWB R LE   Bed Mobility    Supine to Sit Modified Independent   Sit to Supine Modified Independent   Functional Mobility   Sit to Stand Supervised

## 2022-07-07 NOTE — WOUND TEAM
Pt seen by wound team for R foot wounds.  Wounds started as blisters and now have deflated and reveal themselves to be shallow PTWs.  Non stick applied.  Nursing orders written.  Wound team signing off.  Please re consult as needed.

## 2022-07-08 VITALS
BODY MASS INDEX: 22.37 KG/M2 | OXYGEN SATURATION: 95 % | SYSTOLIC BLOOD PRESSURE: 117 MMHG | DIASTOLIC BLOOD PRESSURE: 71 MMHG | TEMPERATURE: 97.2 F | HEIGHT: 72 IN | WEIGHT: 165.12 LBS | HEART RATE: 60 BPM | RESPIRATION RATE: 18 BRPM

## 2022-07-08 PROBLEM — S91.301A OPEN WOUND OF RIGHT FOOT: Status: RESOLVED | Noted: 2022-07-07 | Resolved: 2022-07-08

## 2022-07-08 PROBLEM — Z51.89 ENCOUNTER FOR WOUND CARE: Status: RESOLVED | Noted: 2022-07-06 | Resolved: 2022-07-08

## 2022-07-08 PROBLEM — T81.30XA WOUND DEHISCENCE: Status: RESOLVED | Noted: 2022-07-06 | Resolved: 2022-07-08

## 2022-07-08 PROCEDURE — G0378 HOSPITAL OBSERVATION PER HR: HCPCS

## 2022-07-08 PROCEDURE — 97165 OT EVAL LOW COMPLEX 30 MIN: CPT

## 2022-07-08 PROCEDURE — A9270 NON-COVERED ITEM OR SERVICE: HCPCS | Performed by: STUDENT IN AN ORGANIZED HEALTH CARE EDUCATION/TRAINING PROGRAM

## 2022-07-08 PROCEDURE — 700102 HCHG RX REV CODE 250 W/ 637 OVERRIDE(OP): Performed by: STUDENT IN AN ORGANIZED HEALTH CARE EDUCATION/TRAINING PROGRAM

## 2022-07-08 PROCEDURE — 99217 PR OBSERVATION CARE DISCHARGE: CPT | Performed by: INTERNAL MEDICINE

## 2022-07-08 PROCEDURE — 97535 SELF CARE MNGMENT TRAINING: CPT

## 2022-07-08 RX ADMIN — ACETAMINOPHEN 650 MG: 325 TABLET, FILM COATED ORAL at 07:48

## 2022-07-08 ASSESSMENT — COGNITIVE AND FUNCTIONAL STATUS - GENERAL
SUGGESTED CMS G CODE MODIFIER DAILY ACTIVITY: CH
DAILY ACTIVITIY SCORE: 24

## 2022-07-08 ASSESSMENT — ACTIVITIES OF DAILY LIVING (ADL): TOILETING: INDEPENDENT

## 2022-07-08 ASSESSMENT — PAIN DESCRIPTION - PAIN TYPE: TYPE: ACUTE PAIN

## 2022-07-08 NOTE — CARE PLAN
The patient is Stable - Low risk of patient condition declining or worsening    Shift Goals  Clinical Goals: wound care, PT/OT  Patient Goals: rest  Family Goals: not present      Problem: Pain - Standard  Goal: Alleviation of pain or a reduction in pain to the patient’s comfort goal  Outcome: Progressing     Problem: Knowledge Deficit - Standard  Goal: Patient and family/care givers will demonstrate understanding of plan of care, disease process/condition, diagnostic tests and medications  Outcome: Progressing     Problem: Fall Risk  Goal: Patient will remain free from falls  Outcome: Progressing

## 2022-07-08 NOTE — PROGRESS NOTES
Patient care assumed. Report received from Philly PRINCE    Assessment completed. Pt A&Ox 4. Respirations are even and unlabored on RA. Pt reports 2/10 R foot pain, but denies interventions at this time. VS stable, call light and belongings within reach. POC updated (Wound care, SNF vs. Home health). Pt educated on room and call light, pt verbalized understanding. Communication board updated. Needs met.

## 2022-07-08 NOTE — DISCHARGE INSTRUCTIONS
Recommendations from wound care team:     Remove old dressing. Cleanse wound with wound cleanser and gauze. Pat dry. Cover partial thickness wounds with adaptic (non-stick) and secure with rolled gauze. Change every 48 hours and as needed.     Be sure to follow up with your primary care doctor after discharge for continued surveillance of right foot wounds.    Discharge Instructions    Discharged to home by car with relative. Discharged via wheelchair, hospital escort: Yes.  Special equipment needed: Walker    Be sure to schedule a follow-up appointment with your primary care doctor or any specialists as instructed.     Discharge Plan:   Diet Plan: Discussed  Activity Level: Discussed  Confirmed Follow up Appointment: Patient to Call and Schedule Appointment  Confirmed Symptoms Management: Discussed  Medication Reconciliation Updated: Yes    I understand that a diet low in cholesterol, fat, and sodium is recommended for good health. Unless I have been given specific instructions below for another diet, I accept this instruction as my diet prescription.   Other diet: Regular as tolerated    Special Instructions: None    -Is this patient being discharged with medication to prevent blood clots?  Yes- aspirin    Is patient discharged on Warfarin / Coumadin?   No

## 2022-07-08 NOTE — DISCHARGE SUMMARY
Discharge Summary    CHIEF COMPLAINT ON ADMISSION  Chief Complaint   Patient presents with   • Foot Injury     right       Reason for Admission  Foot pain     Admission Date  7/6/2022    CODE STATUS  Full Code    HPI & HOSPITAL COURSE  66-year-old male who presented with a right foot pain and wound after his right foot was pinned under a piano for 15 hours.  He was initially evaluated at [facility] and was diagnosed with rhabdomyolysis for which he was treated with IV fluids.  X-ray of his right ankle revealed an acute mildly displaced fracture of talar neck. Ortho was consulted and recommended CT right foot for further evaluation which revealed suspected fracture of the proximal diaphysis of the right fifth metatarsal bone, fractures of the dorsal aspect of the talus and calcaneus in dorsal right midfoot soft tissue swelling.  Dr. Luis from orthopedics recommended no surgical intervention, weightbearing as tolerated in boot.  Patient was then seen by wound care and his right foot wounds were determined to be shallow partial-thickness wounds with wound care instructions of RIGHT FOOT:  Cleanse wound with wound cleanser and gauze. Pat dry. Cover PTWs with adaptic followed by rolled gauze. Change every 48hrs and PRN dislodgement and or drainage saturation.  Patient was then seen by PT OT and was determined that he did not need any further PT after discharge from the hospital.  Patient will be discharged and instructed to follow-up with his PCP.      Therefore, he is discharged in good and stable condition to home with close outpatient follow-up.        Discharge Date  7/8/22    FOLLOW UP ITEMS POST DISCHARGE  follow up with pcp    DISCHARGE DIAGNOSES  Principal Problem (Resolved):    Encounter for wound care POA: Unknown  Active Problems:    Atrial fibrillation (HCC) POA: Yes      Overview: Periop post MVR, MAZE.  On sotolol.  Had a flutter preop.    HTN (hypertension) POA: Yes    HLD (hyperlipidemia) POA:  Yes    Displaced fracture of neck of right talus POA: Unknown  Resolved Problems:    Wound dehiscence POA: Yes    Open wound of right foot POA: Unknown      FOLLOW UP  No future appointments.  No follow-up provider specified.    MEDICATIONS ON DISCHARGE     Medication List      CONTINUE taking these medications      Instructions   acetaminophen 500 MG Tabs  Commonly known as: TYLENOL   Take 1,000 mg by mouth every 6 hours as needed for Mild Pain. 2 tablets = 1,000 mg.  Dose: 1,000 mg     aspirin 325 MG Tabs  Commonly known as: ASA   Take 325 mg by mouth every 12 hours. Indications: DVT Prevention  Dose: 325 mg     ibuprofen 200 MG Tabs  Commonly known as: MOTRIN   Take 400 mg by mouth every 6 hours as needed for Mild Pain. 2 tablets = 400 mg.  Dose: 400 mg            Allergies  No Known Allergies    DIET  Orders Placed This Encounter   Procedures   • Diet Order Diet: Cardiac     Standing Status:   Standing     Number of Occurrences:   1     Order Specific Question:   Diet:     Answer:   Cardiac [6]       ACTIVITY  As tolerated.  Weight bearing as tolerated    CONSULTATIONS  Ortho Dr Espino    PROCEDURES  None    LABORATORY  Lab Results   Component Value Date    SODIUM 140 07/06/2022    POTASSIUM 3.9 07/06/2022    CHLORIDE 106 07/06/2022    CO2 22 07/06/2022    GLUCOSE 76 07/06/2022    BUN 22 07/06/2022    CREATININE 0.89 07/06/2022    GLOMRATE 107 07/03/2022        Lab Results   Component Value Date    WBC 6.0 07/06/2022    HEMOGLOBIN 14.0 07/06/2022    HEMATOCRIT 41.8 (L) 07/06/2022    PLATELETCT 367 07/06/2022        Total time of the discharge process exceeds 35 minutes.

## 2022-07-08 NOTE — PROGRESS NOTES
Patient discharged home with family. All personal belongings collected. IV access removed. Discharge instructions discussed. Medications reviewed. Follow up appointments discussed. Patient discharged from discharge lounge without incident.

## 2022-07-08 NOTE — THERAPY
Occupational Therapy   Initial Evaluation     Patient Name: Beny Tolentino  Age:  66 y.o., Sex:  male  Medical Record #: [id number]  Today's Date: 7/8/2022     Precautions  Precautions: (P) Fall Risk, Weight Bearing As Tolerated Right Lower Extremity (with boot per ORTHO)  Comments: NWB R LE per patient- he thinks he recalls being told at [place] he could be WBAT in boot  but unsure    Assessment  Patient is 66 y.o. male admitted for wound care to right foot, he had recently been discharged home from [facility] secondary to a crush injury to R foot (foot was stuck under piano for 15 hours). Pt initially NWB following initial injury, per orthopedic consult pt now WBAT in boot. Pt normally independent with all functional mobility and ADLs, living in a [facility] alone, has family support as needed. Pt able to complete functional mobility and ADLs with supervision, provided education on boot including donning/doffing for functional mobility so he can be WBAT. No further needs identified, will complete OT order at this time. Patient will not be actively followed for occupational therapy services at this time, however may be seen if requested by physician for 1 more visit within 30 days to address any discharge or equipment needs.         Plan    Recommend Occupational Therapy for Evaluation only.    DC Equipment Recommendations: (P) None  Discharge Recommendations: (P) Anticipate that the patient will have no further occupational therapy needs after discharge from the hospital     Objective       07/08/22 1114   Prior Living Situation   Prior Services None   Housing / Facility 1 Story House   Steps Into Home 1   Bathroom Set up   (clawfoot tub)   Equipment Owned Front-Wheel Walker   Lives with - Patient's Self Care Capacity Alone and Able to Care For Self   Prior Level of ADL Function   Self Feeding Independent   Grooming / Hygiene Independent   Bathing Independent   Dressing Independent   Toileting Independent   Prior Level of  IADL Function   Medication Management Independent   Laundry Independent   Kitchen Mobility Independent   Finances Independent   Home Management Independent   Shopping Independent   Prior Level Of Mobility Independent Without Device in Community   Driving / Transportation Driving Independent   Precautions   Precautions Fall Risk;Weight Bearing As Tolerated Right Lower Extremity  (with boot per ORTHO)   Pain 0 - 10 Group   Therapist Pain Assessment Post Activity Pain Same as Prior to Activity;Nurse Notified   Cognition    Cognition / Consciousness WDL   Level of Consciousness Alert   Comments Very pleasant, cooperative   Active ROM Upper Body   Active ROM Upper Body  WDL   Dominant Hand Right   Strength Upper Body   Upper Body Strength  WDL   Sensation Upper Body   Upper Extremity Sensation  WDL   Upper Body Muscle Tone   Upper Body Muscle Tone  WDL   Neurological Concerns   Neurological Concerns No   Coordination Upper Body   Coordination WDL   Balance Assessment   Sitting Balance (Static) Good   Sitting Balance (Dynamic) Good   Standing Balance (Static) Good   Standing Balance (Dynamic) Fair   Weight Shift Sitting Good   Weight Shift Standing Fair   Comments w/ FWW   Bed Mobility    Supine to Sit Supervised   Sit to Supine Supervised   Scooting Supervised   Rolling Supervised   ADL Assessment   Grooming Supervision   Upper Body Dressing Supervision   Lower Body Dressing Supervision   How much help from another person does the patient currently need...   Putting on and taking off regular lower body clothing? 4   Bathing (including washing, rinsing, and drying)? 4   Toileting, which includes using a toilet, bedpan, or urinal? 4   Putting on and taking off regular upper body clothing? 4   Taking care of personal grooming such as brushing teeth? 4   Eating meals? 4   6 Clicks Daily Activity Score 24   Functional Mobility   Sit to Stand Supervised   Bed, Chair, Wheelchair Transfer Supervised   Transfer Method Stand Step    Mobility bed mobility, in room mobility, back to bed   Comments w/ FWW   Activity Tolerance   Sitting Edge of Bed 5 min   Standing 10 min   Education Group   Education Provided Role of Occupational Therapist;Weight Bearing Precautions;Other (comments)  (Boot management)   Role of Occupational Therapist Patient Response Patient;Acceptance;Explanation   Weight Bearing Precautions Patient Response Patient;Acceptance;Explanation;Action Demonstration   Problem List   Problem List None   Anticipated Discharge Equipment and Recommendations   DC Equipment Recommendations None   Discharge Recommendations Anticipate that the patient will have no further occupational therapy needs after discharge from the hospital   Interdisciplinary Plan of Care Collaboration   IDT Collaboration with  Nursing;Physician   Patient Position at End of Therapy In Bed;Call Light within Reach;Tray Table within Reach;Phone within Reach   Collaboration Comments RN updated

## 2022-07-08 NOTE — PROGRESS NOTES
Wound care completed before patient sent to discharge dragan. Reviewed dressing recommendations from our wound team with patient. He verbalizes understanding.
